# Patient Record
Sex: FEMALE | Race: WHITE
[De-identification: names, ages, dates, MRNs, and addresses within clinical notes are randomized per-mention and may not be internally consistent; named-entity substitution may affect disease eponyms.]

---

## 2019-09-14 ENCOUNTER — HOSPITAL ENCOUNTER (EMERGENCY)
Dept: HOSPITAL 25 - ED | Age: 19
Discharge: HOME | End: 2019-09-14
Payer: COMMERCIAL

## 2019-09-14 VITALS — DIASTOLIC BLOOD PRESSURE: 66 MMHG | SYSTOLIC BLOOD PRESSURE: 102 MMHG

## 2019-09-14 DIAGNOSIS — R51: ICD-10-CM

## 2019-09-14 DIAGNOSIS — T78.40XA: Primary | ICD-10-CM

## 2019-09-14 DIAGNOSIS — R44.3: ICD-10-CM
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
## 2019-09-14 PROCEDURE — 93005 ELECTROCARDIOGRAM TRACING: CPT

## 2019-09-14 PROCEDURE — 99282 EMERGENCY DEPT VISIT SF MDM: CPT

## 2019-09-14 NOTE — ED
Allergic Reaction/Systemic





- HPI Summary


HPI Summary: 





This patient is a 18 year old F presenting to [facility] accompanied by sister with 

a chief complaint of reaction to medicine since 9/12/19 night. 


Symptoms aggravated by nothing. Symptoms alleviated by Benadryl. Patient 

reports she took Mucinex DM (has taken regular before never DM) yesterday for 

sinus infection and since then she started experiencing hallucinations, 

episodes of seeing weird colors and not understanding what people were saying, 

disorientations, jaw trembling, and shaky muscles. Patient reports she took her 

first pill before she slept 9/12/19 at night and the next morning she woke up 

and felt off so she took another Mucinex making the situation worse. Later 

that day she took another without eating anything that in the day and symptoms 

worsened to the point of having to clench her jaw and not being able to speak. 

Patient reports she had a sinus congestion, HA in center of face, and was warm 

all over. Patient also reports she took a Tylenol for a fever. 








- History of Current Complaint


Chief Complaint: EDAllergicReaction


Time Seen by Provider: 09/14/19 10:36


Hx Obtained From: Patient


Onset/Duration: Started days ago, Still Present


Timing: Intermittent


Pain Intensity: 0


Pain Scale Used: 0-10 Numeric


Aggravating Factor(s): Nothing


Alleviating Factor(s): OTC Meds - Benadryl


Associated Signs And Symptoms: Positive: Other: - experiencing hallucinations, 

episodes of seeing weird colors and not understanding what people were saying, 

disorientations, jaw trembling, soft legs, shaky muscles





- Allergies/Home Medications


Allergies/Adverse Reactions: 


 Allergies











Allergy/AdvReac Type Severity Reaction Status Date / Time


 


No Known Allergies Allergy   Verified 09/14/19 10:06











Home Medications: 


 Home Medications





NK [No Home Medications Reported]  09/14/19 [History Confirmed 09/14/19]











PMH/Surg Hx/FS Hx/Imm Hx


Endocrine/Hematology History: 


   Denies: Hx Diabetes


Cardiovascular History: 


   Denies: Hx Hypertension


Sensory History: Reports: Hx Contacts or Glasses


Opthamlomology History: Reports: Hx Contacts or Glasses





- Surgical History


Surgery Procedure, Year, and Place: none


Infectious Disease History: No


Infectious Disease History: 


   Denies: Traveled Outside the [place] in Last 30 Days





- Family History


Known Family History: Positive: Cardiac Disease, Diabetes





- Social History


Alcohol Use: Occasionally


Hx Substance Use: No


Substance Use Type: Reports: None


Hx Tobacco Use: No


Smoking Status (MU): Never Smoked Tobacco





Review of Systems


Constitutional: Other - warm all over


Positive: Other - sinus congestion


Positive: Other - soft legs, and shaky muscles


Neurological: Other - hallucinations, episodes of seeing weird colors and not 

understanding what people were saying, disorientations, jaw trembling


Positive: Headache - in center of face


All Other Systems Reviewed And Are Negative: Yes





Physical Exam





- Summary


Physical Exam Summary: 





Constitutional: Well-developed, Well-nourished, Alert. (-) Distressed


Skin: Warm, Dry


HENT: intermittent trembling of the jaw


Eyes: Conjunctiva normal


Neck: Musculoskeletal ROM normal neck. (-) JVD, (-) Stridor, (-) Nuchal rigidity


Cardio: Rhythm regular, rate normal, Heart sounds normal; Intact distal pulses; 

Radial pulses are 2+ and symmetric. (-) Murmur


Pulmonary/Chest wall: Effort normal. (-) Respiratory distress, (-) Wheezes, (-) 

Rales


Abd: Soft, (-) tenderness, (-) Distension, (-) Guarding, (-) Rebound


Musculoskeletal: (-) Edema


Lymph: (-) Cervical adenopathy


Neuro: Alert, Oriented x3


Psych: Mood and affect Normal





Triage Information Reviewed: Yes


Vital Signs On Initial Exam: 


 Initial Vitals











Temp Pulse Resp BP Pulse Ox


 


 98.5 F   92   16   136/84   99 


 


 09/14/19 10:01  09/14/19 10:01  09/14/19 10:01  09/14/19 10:01  09/14/19 10:01











Vital Signs Reviewed: Yes





Diagnostics





- Vital Signs


 Vital Signs











  Temp Pulse Resp BP Pulse Ox


 


 09/14/19 11:00   79    98


 


 09/14/19 10:52   80   140/89  100


 


 09/14/19 10:01  98.5 F  92  16  136/84  99














- Laboratory


Lab Statement: Any lab studies that have been ordered have been reviewed, and 

results considered in the medical decision making process.





- EKG


  ** 1201


Cardiac Rate: NL - 69 BPM


Summary of EKG Findings: An EKG at 1201 reveals normal sinus rhythm at 69 BPM, 

nml axis, nml intervals. No STEMI. No acute changes.





Re-Evaluation





- Re-Evaluation


  ** First Eval


Re-Evaluation Time: 11:50


Comment: Feels better, gets palpitations intermittently at home so dad 

requested eval, will check EKG.





Allergic Reaction Course/Dx





- Course


Course Of Treatment: 17 y/o F p/w medication reaction to dextromethorphan.  - 

PE w anxious appearing female, intermittent tremors otherwise unremarkable.  - 

will give benadryl for comfort.





- Diagnoses


Provider Diagnoses: 


 Allergic reaction








Discharge ED





- Sign-Out/Discharge


Documenting (check all that apply): Patient Departure - discharge


Patient Received Moderate/Deep Sedation with Procedure: No





- Discharge Plan


Condition: Stable


Disposition: HOME


Patient Education Materials:  General Allergic Reaction (ED)


Referrals: 


[facility] - Jordy PAK [Primary Care Provider] - 2 Days


Additional Instructions: 


You were seen in the emergency department for reaction to medication.  We 

advise you no longer take dextromethorphan.  Please take Benadryl as needed for 

symptoms at home.


If any studies were not completed at the time of discharge you will be called 

with the relevant results.


Please follow up with your primary care doctor in next 2-3 days and return to 

emergency department for worsening or concerning symptoms.


It was a pleasure taking care of you today.





- Billing Disposition and Condition


Condition: STABLE


Disposition: Home





- Attestation Statements


Document Initiated by Scribe: Yes


Documenting Scribe: Ivanna Cabrera


Provider For Whom Ana María is Documenting (Include Credential): Dr. Evan Katz MD


Scribe Attestation: 


Ivanna ACOSTA, scribed for Dr. Evan Katz MD on 09/14/19 at 1327. 


Scribe Documentation Reviewed: Yes


Provider Attestation: 


The documentation as recorded by the Ivanna johnson accurately reflects the 

service I personally performed and the decisions made by me, Dr. Evan Katz MD


Status of Scribe Document: Viewed